# Patient Record
Sex: FEMALE | Race: OTHER | URBAN - METROPOLITAN AREA
[De-identification: names, ages, dates, MRNs, and addresses within clinical notes are randomized per-mention and may not be internally consistent; named-entity substitution may affect disease eponyms.]

---

## 2021-12-28 ENCOUNTER — CONSULTATION/EVALUATION (OUTPATIENT)
Dept: URBAN - METROPOLITAN AREA CLINIC 14 | Facility: CLINIC | Age: 51
End: 2021-12-28

## 2021-12-28 DIAGNOSIS — H52.4: ICD-10-CM

## 2021-12-28 DIAGNOSIS — H40.023: ICD-10-CM

## 2021-12-28 PROCEDURE — 99499LK REFRACTIVE CONSULT/LASIK

## 2021-12-28 ASSESSMENT — KERATOMETRY
OS_AXISANGLE2_DEGREES: 81
OS_K1POWER_DIOPTERS: 42.25
OS_AXISANGLE_DEGREES: 171
OD_AXISANGLE_DEGREES: 24
OD_AXISANGLE2_DEGREES: 114
OS_K2POWER_DIOPTERS: 42.75
OD_K2POWER_DIOPTERS: 42.50
OD_K1POWER_DIOPTERS: 42.25

## 2021-12-28 ASSESSMENT — VISUAL ACUITY
OD_SC: 20/30
OU_SC: 20/30
OS_SC: 20/30

## 2021-12-28 ASSESSMENT — PACHYMETRY
OD_CT_UM: 544.0
OS_CT_UM: 549.0

## 2022-01-25 ENCOUNTER — COMPREHENSIVE EXAM (OUTPATIENT)
Dept: URBAN - METROPOLITAN AREA CLINIC 4 | Facility: CLINIC | Age: 52
End: 2022-01-25

## 2022-01-25 DIAGNOSIS — H40.023: ICD-10-CM

## 2022-01-25 PROCEDURE — 92083 EXTENDED VISUAL FIELD XM: CPT

## 2022-01-25 PROCEDURE — 99214 OFFICE O/P EST MOD 30 MIN: CPT

## 2022-01-25 PROCEDURE — 92133 CPTRZD OPH DX IMG PST SGM ON: CPT

## 2022-01-25 ASSESSMENT — VISUAL ACUITY
OD_SC: 20/30+1
OS_SC: 20/25

## 2022-01-25 ASSESSMENT — TONOMETRY
OS_IOP_MMHG: 17
OD_IOP_MMHG: 18

## 2022-04-19 ENCOUNTER — FOLLOW UP (OUTPATIENT)
Dept: URBAN - METROPOLITAN AREA CLINIC 4 | Facility: CLINIC | Age: 52
End: 2022-04-19

## 2022-04-19 DIAGNOSIS — H40.023: ICD-10-CM

## 2022-04-19 PROCEDURE — 99213 OFFICE O/P EST LOW 20 MIN: CPT

## 2022-04-19 ASSESSMENT — TONOMETRY
OS_IOP_MMHG: 17
OD_IOP_MMHG: 18

## 2022-04-19 ASSESSMENT — VISUAL ACUITY
OS_SC: 20/25
OD_SC: 20/30+1

## 2022-06-21 RX ORDER — ESCITALOPRAM OXALATE 10 MG/1
TABLET ORAL
COMMUNITY
Start: 2021-12-22 | End: 2022-08-29

## 2022-06-21 RX ORDER — FLUOXETINE 10 MG/1
1 CAPSULE ORAL
COMMUNITY
Start: 2018-11-16 | End: 2022-08-29

## 2022-06-21 RX ORDER — ESTRADIOL 1 MG/1
TABLET ORAL
COMMUNITY
Start: 2021-10-26 | End: 2022-08-29

## 2022-06-21 RX ORDER — PROGESTERONE 100 MG/1
CAPSULE ORAL
COMMUNITY
Start: 2021-10-26 | End: 2022-08-29

## 2022-06-21 RX ORDER — ZOLMITRIPTAN 5 MG/1
TABLET, FILM COATED ORAL
COMMUNITY
Start: 2016-06-09 | End: 2022-08-29

## 2022-08-23 ENCOUNTER — FOLLOW UP (OUTPATIENT)
Dept: URBAN - METROPOLITAN AREA CLINIC 4 | Facility: CLINIC | Age: 52
End: 2022-08-23

## 2022-08-23 DIAGNOSIS — H40.023: ICD-10-CM

## 2022-08-23 PROCEDURE — 99213 OFFICE O/P EST LOW 20 MIN: CPT

## 2022-08-23 ASSESSMENT — VISUAL ACUITY
OU_SC: J3
OD_SC: 20/25-2
OS_SC: 20/25-1

## 2022-08-23 ASSESSMENT — TONOMETRY
OS_IOP_MMHG: 20
OD_IOP_MMHG: 16

## 2022-08-29 PROBLEM — N92.0 MENORRHAGIA WITH REGULAR CYCLE: Status: ACTIVE | Noted: 2022-08-29

## 2022-08-29 PROBLEM — M79.671 PAIN IN RIGHT FOOT: Status: ACTIVE | Noted: 2022-08-29

## 2022-08-29 PROBLEM — G43.009 MIGRAINE WITHOUT AURA AND WITHOUT STATUS MIGRAINOSUS, NOT INTRACTABLE: Status: ACTIVE | Noted: 2022-08-29

## 2022-08-29 PROBLEM — M20.41 HAMMERTOE OF RIGHT FOOT: Status: ACTIVE | Noted: 2022-08-29

## 2022-08-29 PROBLEM — E55.9 VITAMIN D DEFICIENCY: Status: ACTIVE | Noted: 2022-08-29

## 2022-08-29 PROBLEM — R13.13 PHARYNGEAL DYSPHAGIA: Status: ACTIVE | Noted: 2022-08-29

## 2022-08-29 PROBLEM — F32.A DEPRESSION: Status: ACTIVE | Noted: 2022-08-29

## 2022-08-29 PROBLEM — M67.88 ACHILLES TENDINOSIS OF RIGHT LOWER EXTREMITY: Status: ACTIVE | Noted: 2022-08-29

## 2022-08-29 PROBLEM — G43.909 MIGRAINE: Status: ACTIVE | Noted: 2022-08-29

## 2022-08-29 PROBLEM — M65.4 RADIAL STYLOID TENOSYNOVITIS: Status: ACTIVE | Noted: 2022-08-29

## 2022-08-29 PROBLEM — N95.1 MENOPAUSAL SYMPTOMS: Status: ACTIVE | Noted: 2022-08-29

## 2022-08-29 PROBLEM — R73.9 HYPERGLYCEMIA: Status: ACTIVE | Noted: 2022-08-29

## 2022-08-29 PROBLEM — N76.1 SUBACUTE AND CHRONIC VAGINITIS: Status: ACTIVE | Noted: 2022-08-29

## 2022-08-29 PROBLEM — G89.29 OTHER CHRONIC PAIN: Status: ACTIVE | Noted: 2022-08-29

## 2022-08-29 PROBLEM — M54.42 LUMBAGO WITH SCIATICA, LEFT SIDE: Status: ACTIVE | Noted: 2022-08-29
